# Patient Record
Sex: MALE | Race: WHITE | NOT HISPANIC OR LATINO | Employment: OTHER | URBAN - METROPOLITAN AREA
[De-identification: names, ages, dates, MRNs, and addresses within clinical notes are randomized per-mention and may not be internally consistent; named-entity substitution may affect disease eponyms.]

---

## 2021-12-08 PROBLEM — Z79.621 LONG-TERM CURRENT USE OF TACROLIMUS: Status: ACTIVE | Noted: 2021-12-08

## 2021-12-08 PROBLEM — I25.5 ISCHEMIC CARDIOMYOPATHY: Status: ACTIVE | Noted: 2021-12-08

## 2021-12-08 PROBLEM — I25.10 CORONARY ARTERY DISEASE INVOLVING NATIVE CORONARY ARTERY OF NATIVE HEART WITHOUT ANGINA PECTORIS: Status: ACTIVE | Noted: 2021-12-08

## 2021-12-08 PROBLEM — Z86.39 H/O HYPERGLYCEMIA: Status: ACTIVE | Noted: 2021-12-08

## 2021-12-08 PROBLEM — E78.5 HYPERLIPIDEMIA: Status: ACTIVE | Noted: 2021-12-08

## 2022-03-07 PROBLEM — I50.20 HEART FAILURE WITH REDUCED EJECTION FRACTION, NYHA CLASS II: Status: ACTIVE | Noted: 2022-03-07

## 2022-03-07 PROBLEM — Z86.79 HISTORY OF ATRIAL FIBRILLATION: Status: ACTIVE | Noted: 2022-03-07

## 2023-03-27 PROBLEM — Z79.624 ON MYCOPHENOLATE MOFETIL THERAPY: Status: ACTIVE | Noted: 2023-03-27

## 2023-03-27 PROBLEM — Z79.899 ON MYCOPHENOLATE MOFETIL THERAPY: Status: ACTIVE | Noted: 2023-03-27

## 2023-03-27 PROBLEM — R06.09 DYSPNEA ON EXERTION: Status: ACTIVE | Noted: 2023-03-27

## 2023-03-27 PROBLEM — E66.09 CLASS 2 OBESITY DUE TO EXCESS CALORIES WITHOUT SERIOUS COMORBIDITY WITH BODY MASS INDEX (BMI) OF 37.0 TO 37.9 IN ADULT: Status: ACTIVE | Noted: 2023-03-27

## 2023-03-27 PROBLEM — I20.89 EQUIVALENT ANGINA: Status: ACTIVE | Noted: 2023-03-27

## 2023-03-27 PROBLEM — E66.812 CLASS 2 OBESITY DUE TO EXCESS CALORIES WITHOUT SERIOUS COMORBIDITY WITH BODY MASS INDEX (BMI) OF 37.0 TO 37.9 IN ADULT: Status: ACTIVE | Noted: 2023-03-27

## 2023-03-27 PROBLEM — Z91.89 MULTIPLE RISK FACTORS FOR CORONARY ARTERY DISEASE: Status: ACTIVE | Noted: 2023-03-27

## 2023-05-23 PROBLEM — Z87.891 FORMER SMOKER, STOPPED SMOKING IN DISTANT PAST: Status: ACTIVE | Noted: 2023-05-23

## 2023-08-29 PROBLEM — K81.9 CHOLECYSTITIS: Status: ACTIVE | Noted: 2023-08-29

## 2023-08-29 PROBLEM — R10.9 ABDOMINAL PAIN: Status: ACTIVE | Noted: 2023-08-29

## 2024-05-22 PROBLEM — Z86.39 H/O HYPERGLYCEMIA: Status: RESOLVED | Noted: 2021-12-08 | Resolved: 2024-05-22

## 2024-05-22 PROBLEM — D64.9 ANEMIA: Status: ACTIVE | Noted: 2024-05-22

## 2024-05-22 PROBLEM — Z86.39 HISTORY OF HYPERGLYCEMIA: Status: ACTIVE | Noted: 2024-05-22

## 2024-05-22 PROBLEM — Z90.49 HISTORY OF LAPAROSCOPIC CHOLECYSTECTOMY: Status: ACTIVE | Noted: 2024-05-22

## 2024-05-22 PROBLEM — Z87.891 HISTORY OF TOBACCO ABUSE: Status: ACTIVE | Noted: 2024-05-22

## 2024-05-22 PROBLEM — R06.09 DYSPNEA ON EXERTION: Status: ACTIVE | Noted: 2024-05-22

## 2024-05-22 PROBLEM — R94.31 ABNORMAL EKG: Status: ACTIVE | Noted: 2024-05-22

## 2024-05-22 PROBLEM — Z87.891 FORMER SMOKER, STOPPED SMOKING IN DISTANT PAST: Status: RESOLVED | Noted: 2023-05-23 | Resolved: 2024-05-22

## 2024-05-22 PROBLEM — Z01.818 PRE-OP EVALUATION: Status: ACTIVE | Noted: 2024-05-22

## 2024-05-22 PROBLEM — R06.09 DOE (DYSPNEA ON EXERTION): Status: RESOLVED | Noted: 2023-03-27 | Resolved: 2024-05-22

## 2024-06-20 ENCOUNTER — OFFICE VISIT (OUTPATIENT)
Dept: OPHTHALMOLOGY | Facility: CLINIC | Age: 79
End: 2024-06-20
Payer: MEDICARE

## 2024-06-20 DIAGNOSIS — H25.12 AGE-RELATED NUCLEAR CATARACT OF LEFT EYE: ICD-10-CM

## 2024-06-20 DIAGNOSIS — H40.003 GLAUCOMA SUSPECT OF BOTH EYES: ICD-10-CM

## 2024-06-20 DIAGNOSIS — D09.21 SQUAMOUS CELL CARCINOMA IN SITU (SCCIS) OF RIGHT CONJUNCTIVA: Primary | ICD-10-CM

## 2024-06-20 DIAGNOSIS — H04.129 DRY EYE: ICD-10-CM

## 2024-06-20 DIAGNOSIS — H11.9 CONJUNCTIVAL LESION: ICD-10-CM

## 2024-06-20 PROCEDURE — 99203 OFFICE O/P NEW LOW 30 MIN: CPT | Mod: S$GLB,,, | Performed by: OPHTHALMOLOGY

## 2024-06-20 PROCEDURE — 92285 EXTERNAL OCULAR PHOTOGRAPHY: CPT | Mod: S$GLB,,, | Performed by: OPHTHALMOLOGY

## 2024-06-27 NOTE — PROGRESS NOTES
HPI    Referred by Dr. Rodriguez    Squamous cell carcinoma in-situ, right conjunctiva  Sx: Cataract extraction OD 2019  EXCISION, LESION, CONJUNCTIVA (Right) 04/03/2024    Gtt's: None     Patient is here for SCCIS evaluation.  Pt. States to have completed treatment and is ready to proceed with sx but   at the [facility] the machine broke and was referred to [facility].    Last edited by Carolina Brand MD on 6/28/2024  9:37 AM.            Assessment /Plan     For exam results, see Encounter Report.    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 Plan to do aidan-adjuvant topical therapy with mitomycin C with 0.04% for 2 to 3 cycles. Each cycle of chemotherapy is 1 week on with QID application, 1 week off. We will re-examine the patient every 2 week at the end of each cycle. At the end of the second cycle, we will determine if he needs a 3rd cycle.   - 5/22/24: day after second series of MMC.  Per photos the lesion looks smaller but still at least 1/2 still present. Per Dr. Rodriguez the primary goal of MMC is lowering corneal involvement prior to conj excision      Cycle 1:  1 drop QID OD for 1 week  Break for 1 week -   Cycle 2:  1 drop QID OD for 1 week  Break for 1 week -   Cycle 3 (start 5/29/24):  1 drop QID OD for 1 week  Break for 1 week -         Plan for: conjunctival lesion resection, cryotherapy, alcohol epitheliectomy OD      Needs AmnioGraft 2.5mm x 2.0mm  and 2.0x 1.5mm

## 2024-06-28 ENCOUNTER — TELEPHONE (OUTPATIENT)
Dept: OPHTHALMOLOGY | Facility: CLINIC | Age: 79
End: 2024-06-28
Payer: MEDICARE

## 2024-06-28 NOTE — TELEPHONE ENCOUNTER
----- Message from Tony Norton sent at 6/28/2024 11:14 AM CDT -----  Contact: [phone]  Pt wife is calling to speak with you about her 's sx. Please call back to further assist.

## 2024-07-05 ENCOUNTER — TELEPHONE (OUTPATIENT)
Dept: OPHTHALMOLOGY | Facility: CLINIC | Age: 79
End: 2024-07-05
Payer: MEDICARE

## 2024-07-05 NOTE — TELEPHONE ENCOUNTER
Spoke with pt wife  provided arrival time of 9:00 for sx on 7/12/24 with Dr. Brand @ [facility].

## 2024-07-10 NOTE — PRE-PROCEDURE INSTRUCTIONS
PreOp Instructions given:   - Verbal medication information (what to hold and what to take) -  Pt states he will not be taking any medications the am of Sx  - NPO guidelines 2400  - Arrival place directions given; time to be given the day before procedure by the   Surgeon's Office [address] [facility] [place]  - Bathing with antibacterial soap   - Don't wear any jewelry or bring any valuables AM of surgery   - No makeup or moisturizer to face   - No perfume/cologne, powder, lotions or aftershave   Pt. verbalized understanding.   Pt denies any h/o Anesthesia/Sedation complications or side effects.    Heart transplant - 2/23/2016

## 2024-07-12 ENCOUNTER — HOSPITAL ENCOUNTER (OUTPATIENT)
Facility: HOSPITAL | Age: 79
Discharge: HOME | End: 2024-07-12
Attending: OPHTHALMOLOGY | Admitting: OPHTHALMOLOGY
Payer: MEDICARE

## 2024-07-12 ENCOUNTER — ANESTHESIA (OUTPATIENT)
Dept: SURGERY | Facility: HOSPITAL | Age: 79
End: 2024-07-12
Payer: MEDICARE

## 2024-07-12 ENCOUNTER — ANESTHESIA EVENT (OUTPATIENT)
Dept: SURGERY | Facility: HOSPITAL | Age: 79
End: 2024-07-12
Payer: MEDICARE

## 2024-07-12 VITALS
TEMPERATURE: 98 F | HEIGHT: 71 IN | HEART RATE: 80 BPM | SYSTOLIC BLOOD PRESSURE: 175 MMHG | WEIGHT: 248 LBS | DIASTOLIC BLOOD PRESSURE: 86 MMHG | OXYGEN SATURATION: 79 % | BODY MASS INDEX: 34.72 KG/M2 | RESPIRATION RATE: 18 BRPM

## 2024-07-12 DIAGNOSIS — H25.10 AGE-RELATED NUCLEAR CATARACT: ICD-10-CM

## 2024-07-12 DIAGNOSIS — H11.9 CONJUNCTIVAL LESION: Primary | ICD-10-CM

## 2024-07-12 PROCEDURE — 88342 IMHCHEM/IMCYTCHM 1ST ANTB: CPT | Mod: 26,,, | Performed by: STUDENT IN AN ORGANIZED HEALTH CARE EDUCATION/TRAINING PROGRAM

## 2024-07-12 PROCEDURE — 71000044 HC DOSC ROUTINE RECOVERY FIRST HOUR: Performed by: OPHTHALMOLOGY

## 2024-07-12 PROCEDURE — 68815 PROBE NASOLACRIMAL DUCT: CPT | Mod: RT,,, | Performed by: OPHTHALMOLOGY

## 2024-07-12 PROCEDURE — 63600175 PHARM REV CODE 636 W HCPCS: Performed by: NURSE ANESTHETIST, CERTIFIED REGISTERED

## 2024-07-12 PROCEDURE — 71000015 HC POSTOP RECOV 1ST HR: Performed by: OPHTHALMOLOGY

## 2024-07-12 PROCEDURE — 36000706: Performed by: OPHTHALMOLOGY

## 2024-07-12 PROCEDURE — 27201423 OPTIME MED/SURG SUP & DEVICES STERILE SUPPLY: Performed by: OPHTHALMOLOGY

## 2024-07-12 PROCEDURE — 37000008 HC ANESTHESIA 1ST 15 MINUTES: Performed by: OPHTHALMOLOGY

## 2024-07-12 PROCEDURE — 25000003 PHARM REV CODE 250: Performed by: OPHTHALMOLOGY

## 2024-07-12 PROCEDURE — 88341 IMHCHEM/IMCYTCHM EA ADD ANTB: CPT | Mod: 26,,, | Performed by: STUDENT IN AN ORGANIZED HEALTH CARE EDUCATION/TRAINING PROGRAM

## 2024-07-12 PROCEDURE — 88305 TISSUE EXAM BY PATHOLOGIST: CPT | Mod: 26,,, | Performed by: STUDENT IN AN ORGANIZED HEALTH CARE EDUCATION/TRAINING PROGRAM

## 2024-07-12 PROCEDURE — V2790 AMNIOTIC MEMBRANE: HCPCS | Performed by: OPHTHALMOLOGY

## 2024-07-12 PROCEDURE — 88342 IMHCHEM/IMCYTCHM 1ST ANTB: CPT | Performed by: STUDENT IN AN ORGANIZED HEALTH CARE EDUCATION/TRAINING PROGRAM

## 2024-07-12 PROCEDURE — 25000003 PHARM REV CODE 250: Performed by: NURSE ANESTHETIST, CERTIFIED REGISTERED

## 2024-07-12 PROCEDURE — 88305 TISSUE EXAM BY PATHOLOGIST: CPT | Performed by: STUDENT IN AN ORGANIZED HEALTH CARE EDUCATION/TRAINING PROGRAM

## 2024-07-12 PROCEDURE — 37000009 HC ANESTHESIA EA ADD 15 MINS: Performed by: OPHTHALMOLOGY

## 2024-07-12 PROCEDURE — 88341 IMHCHEM/IMCYTCHM EA ADD ANTB: CPT | Performed by: STUDENT IN AN ORGANIZED HEALTH CARE EDUCATION/TRAINING PROGRAM

## 2024-07-12 PROCEDURE — 36000707: Performed by: OPHTHALMOLOGY

## 2024-07-12 PROCEDURE — 65779 COVER EYE W/MEMBRANE SUTURE: CPT | Mod: 51,RT,, | Performed by: OPHTHALMOLOGY

## 2024-07-12 DEVICE — TISSUE AMNIOGRAFT: Type: IMPLANTABLE DEVICE | Site: EYE | Status: FUNCTIONAL

## 2024-07-12 RX ORDER — DEXAMETHASONE SODIUM PHOSPHATE 4 MG/ML
INJECTION, SOLUTION INTRA-ARTICULAR; INTRALESIONAL; INTRAMUSCULAR; INTRAVENOUS; SOFT TISSUE
Status: DISCONTINUED
Start: 2024-07-12 | End: 2024-07-12 | Stop reason: HOSPADM

## 2024-07-12 RX ORDER — CEFAZOLIN SODIUM 500 MG/2.2ML
INJECTION, POWDER, FOR SOLUTION INTRAMUSCULAR; INTRAVENOUS
Status: DISCONTINUED
Start: 2024-07-12 | End: 2024-07-12 | Stop reason: HOSPADM

## 2024-07-12 RX ORDER — FENTANYL CITRATE 50 UG/ML
INJECTION, SOLUTION INTRAMUSCULAR; INTRAVENOUS
Status: DISCONTINUED | OUTPATIENT
Start: 2024-07-12 | End: 2024-07-12

## 2024-07-12 RX ORDER — LIDOCAINE HYDROCHLORIDE AND EPINEPHRINE 10; 10 MG/ML; UG/ML
INJECTION, SOLUTION INFILTRATION; PERINEURAL
Status: DISCONTINUED
Start: 2024-07-12 | End: 2024-07-12 | Stop reason: HOSPADM

## 2024-07-12 RX ORDER — ONDANSETRON HYDROCHLORIDE 2 MG/ML
4 INJECTION, SOLUTION INTRAVENOUS DAILY PRN
Status: DISCONTINUED | OUTPATIENT
Start: 2024-07-12 | End: 2024-07-12 | Stop reason: HOSPADM

## 2024-07-12 RX ORDER — NEOMYCIN SULFATE, POLYMYXIN B SULFATE, AND DEXAMETHASONE 3.5; 10000; 1 MG/G; [USP'U]/G; MG/G
OINTMENT OPHTHALMIC
Status: DISCONTINUED
Start: 2024-07-12 | End: 2024-07-12 | Stop reason: HOSPADM

## 2024-07-12 RX ORDER — ACETAMINOPHEN 325 MG/1
650 TABLET ORAL EVERY 4 HOURS PRN
Status: DISCONTINUED | OUTPATIENT
Start: 2024-07-12 | End: 2024-07-12 | Stop reason: HOSPADM

## 2024-07-12 RX ORDER — MIDAZOLAM HYDROCHLORIDE 1 MG/ML
INJECTION INTRAMUSCULAR; INTRAVENOUS
Status: DISCONTINUED | OUTPATIENT
Start: 2024-07-12 | End: 2024-07-12

## 2024-07-12 RX ORDER — EPINEPHRINE 1 MG/ML
INJECTION, SOLUTION, CONCENTRATE INTRAVENOUS
Status: DISCONTINUED
Start: 2024-07-12 | End: 2024-07-12 | Stop reason: HOSPADM

## 2024-07-12 RX ORDER — MOXIFLOXACIN 5 MG/ML
1 SOLUTION/ DROPS OPHTHALMIC
Status: COMPLETED | OUTPATIENT
Start: 2024-07-12 | End: 2024-07-12

## 2024-07-12 RX ORDER — GLUCAGON 1 MG
1 KIT INJECTION
Status: DISCONTINUED | OUTPATIENT
Start: 2024-07-12 | End: 2024-07-12 | Stop reason: HOSPADM

## 2024-07-12 RX ORDER — PROPARACAINE HYDROCHLORIDE 5 MG/ML
1 SOLUTION/ DROPS OPHTHALMIC DAILY PRN
Status: DISCONTINUED | OUTPATIENT
Start: 2024-07-12 | End: 2024-07-12 | Stop reason: HOSPADM

## 2024-07-12 RX ADMIN — SODIUM CHLORIDE: 0.9 INJECTION, SOLUTION INTRAVENOUS at 11:07

## 2024-07-12 RX ADMIN — PROPARACAINE HYDROCHLORIDE 1 DROP: 5 SOLUTION/ DROPS OPHTHALMIC at 11:07

## 2024-07-12 RX ADMIN — MOXIFLOXACIN OPHTHALMIC 1 DROP: 5 SOLUTION/ DROPS OPHTHALMIC at 11:07

## 2024-07-12 RX ADMIN — FENTANYL CITRATE 50 MCG: 50 INJECTION, SOLUTION INTRAMUSCULAR; INTRAVENOUS at 12:07

## 2024-07-12 RX ADMIN — MIDAZOLAM HYDROCHLORIDE 1 MG: 2 INJECTION, SOLUTION INTRAMUSCULAR; INTRAVENOUS at 11:07

## 2024-07-12 RX ADMIN — MIDAZOLAM HYDROCHLORIDE 1 MG: 2 INJECTION, SOLUTION INTRAMUSCULAR; INTRAVENOUS at 12:07

## 2024-07-12 NOTE — H&P
History    Chief complaint:  conj lesion    Present Ilness/Diagnosis: conj lesion    Past Medical History:  has a past medical history of Atrial fibrillation, CHF (congestive heart failure), Coronary artery disease, Hypertension, ICD (implantable cardioverter-defibrillator) in place, S/P orthotopic heart transplant, Type 2 diabetes mellitus, and Ventricular tachycardia, nonsustained.    Family History/Social History: refer to chart    Allergies:   Review of patient's allergies indicates:   Allergen Reactions    Entresto [sacubitril-valsartan]     Valsartan        Current Medications: No current facility-administered medications for this encounter.    ASA Score: II     Mallampati Score: II     Plan for Sedation: Moderate Sedation     Patient or Family History of Anesthesia problems : No    Physical Exam    BP: Vital signs stable  General: No apparent distress  HEENT: conj lesion  Lungs: adequate respirations  Heart: + pulses  Abdomen: soft  Rectal/pelvic: deferred    Impression: conj lesion    See previous clinic notes for surgical indications.    Plan: excisional bx with cryo

## 2024-07-12 NOTE — OP NOTE
DATE OF SURGERY: 7/11/24    SURGEON: Carolina Brand MD    PREOPERATIVE DIAGNOSIS:  -   -Squamous cell carcinoma in-situ  of the conjunctiva right eye                                                                                POSTOPERATIVE DIAGNOSIS:  same    PROCEDURE PERFORMED:  Conjunctival lesion excision with cryotherapy and placement of amniotic membrane graft secured with sutures.    ANESTHESIA:  Monitored anesthesia care and topical.    SPECIMEN: Conjuntival lesion, sent to pathology    INDICATIONS FOR PROCEDURE:  The patient presented to clinic with suspicious conjunctival lesion.  Risks, benefits and alternatives were discussed, and the  patient agreed to proceed with lesion excision.     PROCEDURE IN DETAIL:  Patient was met in the preop holding area and the surgical site was marked.  The consent was confirmed to be signed.  The patient was brought into the operating room by the anesthesia  team and placed under monitored anesthesia care.  Then 4% lidocaine drops were placed into the right eye.  The right eye was prepped and draped in a sterile ophthalmic fashion.  A Kalen speculum was placed into the right eye.  The extent of the lesion was measured, and 3mm margins were demarcated with the marking pen.  2% Lidocaine with epi was injected underneath the lesion. Blunt wescotts were used to remove the lesion, taking care to not touch the actual lesion with the instruments.  The limbal aspect was removed with a partial shallow lamellar keratectomy.  The lesion was removed and sent to pathology.  The instruments used were passed off.  Cautery was used to achieve hemostasis.   Cryotherapy was used in a double freeze-thaw manner around the periphery of the site. The bare sclera site was dried and measured.  An amniotic membrane graft graft was secured into position with 8-0 vicryl sutures and evisel glue. Maxitrol ointment was placed into the eye.  The Kalen speculum was removed.  The eye was  washed, dried, patched, and shielded. The patient tolerated the procedure well.  The patient is to follow up next week, sooner if needed.

## 2024-07-12 NOTE — ANESTHESIA POSTPROCEDURE EVALUATION
Anesthesia Post Evaluation    Patient: Ramon Todd    Procedure(s) Performed: Procedure(s) (LRB):  EXCISION, LESION, CONJUNCTIVA (Right)    Final Anesthesia Type: MAC      Patient location during evaluation: PACU  Patient participation: Yes- Able to Participate  Level of consciousness: awake and alert  Post-procedure vital signs: reviewed and stable  Pain management: adequate  Airway patency: patent  NIDIA mitigation strategies: Multimodal analgesia  PONV status at discharge: No PONV  Anesthetic complications: no      Cardiovascular status: stable  Respiratory status: unassisted and spontaneous ventilation  Hydration status: euvolemic  Follow-up not needed.              Vitals Value Taken Time   /86 07/12/24 1317   Temp 36.5 °C (97.7 °F) 07/12/24 1248   Pulse 80 07/12/24 1330   Resp 18 07/12/24 1300   SpO2 79 % 07/12/24 1330         No case tracking events are documented in the log.      Pain/Yu Score: Yu Score: 9 (7/12/2024 12:48 PM)

## 2024-07-12 NOTE — TRANSFER OF CARE
"Anesthesia Transfer of Care Note    Patient: Ramon Todd    Procedure(s) Performed: Procedure(s) (LRB):  EXCISION, LESION, CONJUNCTIVA (Right)    Patient location: PACU    Anesthesia Type: general    Transport from OR: Transported from OR on room air with adequate spontaneous ventilation    Post pain: adequate analgesia    Post assessment: no apparent anesthetic complications and tolerated procedure well    Post vital signs: stable    Level of consciousness: awake    Nausea/Vomiting: no nausea/vomiting    Complications: none    Transfer of care protocol was followed      Last vitals: Visit Vitals  BP (!) 187/97   Pulse 86   Temp 36.5 °C (97.7 °F) (Skin)   Resp 18   Ht 5' 11" (1.803 m)   Wt 112.5 kg (248 lb)   SpO2 95%   BMI 34.59 kg/m²     "

## 2024-07-12 NOTE — ANESTHESIA PREPROCEDURE EVALUATION
07/12/2024  Ramon Todd is a 78 y.o., male.    Past Medical History:   Diagnosis Date    Atrial fibrillation     CHF (congestive heart failure)     Coronary artery disease     Hypertension     ICD (implantable cardioverter-defibrillator) in place     S/P orthotopic heart transplant     Type 2 diabetes mellitus     S/P heart Tx    Ventricular tachycardia, nonsustained        Past Surgical History:   Procedure Laterality Date    CARDIAC CATHETERIZATION Right     CORONARY ARTERY BYPASS GRAFT      2006    EXCISION, LESION, CONJUNCTIVA Right 4/3/2024    Procedure: EXCISION, LESION, CONJUNCTIVA;  Surgeon: Wil Beck MD;  Location: [facility];  Service: Ophthalmology;  Laterality: Right;    HEART TRANSPLANT      2/2016    LAPAROSCOPIC CHOLECYSTECTOMY N/A 08/29/2023    Procedure: CHOLECYSTECTOMY, LAPAROSCOPIC;  Surgeon: Oz Aguero MD;  Location: [facility];  Service: General;  Laterality: N/A;    REMOVAL, ICD      and placement           Pre-op Assessment          Review of Systems  Anesthesia Hx:  No problems with previous Anesthesia   History of prior surgery of interest to airway management or planning:          Denies Family Hx of Anesthesia complications.    Denies Personal Hx of Anesthesia complications.                    Cardiovascular:  Exercise tolerance: good   Hypertension       Denies Angina.       Denies GOLDBERG.   Had heart transplant  Feels well                         Neurological:  Neurology Normal                                          Physical Exam  General: Alert, Oriented and Well nourished    Airway:  Mallampati: II   Mouth Opening: Normal  TM Distance: Normal  Neck ROM: Normal ROM    Dental:  Edentulous    Chest/Lungs:  Normal Respiratory Rate    Heart:  Rate: Normal  Rhythm: Regular Rhythm        Anesthesia Plan  Type of Anesthesia, risks & benefits discussed:    Anesthesia  Type: MAC, Gen Natural Airway  Intra-op Monitoring Plan: Standard ASA Monitors  Post Op Pain Control Plan: multimodal analgesia and IV/PO Opioids PRN  Informed Consent: Informed consent signed with the Patient and all parties understand the risks and agree with anesthesia plan.  All questions answered.   ASA Score: 3  Day of Surgery Review of History & Physical: H&P Update referred to the surgeon/provider.    Ready For Surgery From Anesthesia Perspective.     .

## 2024-07-12 NOTE — BRIEF OP NOTE
BRIEF DISCHARGE NOTE:    Date of discharge: 07/12/2024    Reason for hospitalization -  conj surgery     Final Diagnosis - conj lesion    Procedures and treatment provided - Status post conj lesion excision    Diet - Advance to regular as tolerated    Activity - as tolerated    Disposition at the end of the case - Good.    Discharge: to home    The patient tolerated the procedure well and knows to follow up with next week, sooner if needed.    Patient and family instructions (as appropriate) - Given to patient on discharge    Carolina Brand MD

## 2024-07-19 LAB
COMMENT: NORMAL
FINAL PATHOLOGIC DIAGNOSIS: NORMAL
GROSS: NORMAL
Lab: NORMAL
MICROSCOPIC EXAM: NORMAL

## 2025-05-13 ENCOUNTER — OFFICE VISIT (OUTPATIENT)
Dept: CARDIOLOGY | Facility: CLINIC | Age: 80
End: 2025-05-13
Attending: INTERNAL MEDICINE
Payer: MEDICARE

## 2025-05-13 VITALS
WEIGHT: 259.94 LBS | HEART RATE: 85 BPM | OXYGEN SATURATION: 98 % | DIASTOLIC BLOOD PRESSURE: 92 MMHG | HEIGHT: 71 IN | RESPIRATION RATE: 18 BRPM | SYSTOLIC BLOOD PRESSURE: 170 MMHG | BODY MASS INDEX: 36.39 KG/M2

## 2025-05-13 DIAGNOSIS — R73.01 IFG (IMPAIRED FASTING GLUCOSE): ICD-10-CM

## 2025-05-13 DIAGNOSIS — I10 ESSENTIAL HYPERTENSION: ICD-10-CM

## 2025-05-13 DIAGNOSIS — E78.5 HYPERLIPIDEMIA, UNSPECIFIED HYPERLIPIDEMIA TYPE: ICD-10-CM

## 2025-05-13 DIAGNOSIS — Z94.1 S/P ORTHOTOPIC HEART TRANSPLANT: Primary | ICD-10-CM

## 2025-05-13 DIAGNOSIS — E66.09 CLASS 2 OBESITY DUE TO EXCESS CALORIES WITHOUT SERIOUS COMORBIDITY WITH BODY MASS INDEX (BMI) OF 37.0 TO 37.9 IN ADULT: ICD-10-CM

## 2025-05-13 DIAGNOSIS — Z76.89 ENCOUNTER TO ESTABLISH CARE: ICD-10-CM

## 2025-05-13 DIAGNOSIS — R94.31 ABNORMAL EKG: ICD-10-CM

## 2025-05-13 DIAGNOSIS — E66.812 CLASS 2 OBESITY DUE TO EXCESS CALORIES WITHOUT SERIOUS COMORBIDITY WITH BODY MASS INDEX (BMI) OF 37.0 TO 37.9 IN ADULT: ICD-10-CM

## 2025-05-13 DIAGNOSIS — Z01.818 PREOPERATIVE EXAMINATION: ICD-10-CM

## 2025-05-13 PROCEDURE — 99999 PR PBB SHADOW E&M-EST. PATIENT-LVL III: CPT | Mod: PBBFAC,,, | Performed by: INTERNAL MEDICINE

## 2025-05-13 RX ORDER — MELOXICAM 15 MG/1
15 TABLET ORAL
COMMUNITY
Start: 2024-12-10

## 2025-05-13 NOTE — ASSESSMENT & PLAN NOTE
His current Electrocardiogram shows nonspecific ST changes only.  Compared to previous tracings the ST segments are somewhat more pronounced.

## 2025-05-13 NOTE — ASSESSMENT & PLAN NOTE
[facility], [place].  They advised him to follow-up locally for blood work echoes and Electrocardiogram.    Exam is due in [place] by July 1, 2025.    He is doing well current therapy to continue.

## 2025-05-13 NOTE — ASSESSMENT & PLAN NOTE
Carvedilol and amlodipine to continue.  Condition controlled.  Blood pressure today in my office elevated.

## 2025-05-13 NOTE — PROGRESS NOTES
[facility] Cardiology     Subjective:    Patient ID:  Ramon Todd is a 79 y.o. male who presents for evaluation of Heart transplant 2016 [place], Hyperlipidemia, and Hypertension    Review of patient's allergies indicates:   Allergen Reactions    Entresto [sacubitril-valsartan]     Valsartan      The patient underwent cardiac transplantation 2016 February in [facility].  He has done well since then.  He had had previous bypass with end-stage left ventricular dysfunction.  He had been evaluated at this facility but he was told he was too old and they could not offer him transplant.    His history included previous bypass surgery.  His initial bypass surgery was 2006.  He had a defibrillator prior to his transplant.    He has had negative stress tests.  He is on therapy for hypertension and hyperlipidemia.  He was being followed at [facility] but he is had trouble getting reports to his team in [place] prompting the decision to establish at this facility.    His cardiologist in [place] is Lobito Wahl [phone].    His coordinator is Gely [phone].  Fax [phone].    He states he needs yearly echoes and Electrocardiogram is as well as blood work.  His last video visit with the team at [facility] was December 2024.    He states he only needs rejection drug blood levels once a year.  He is going to supply me a request for what blood work is needed.    Today's electrocardiogram reviewed and independently interpreted by myself confirms heart rate 86 sinus rhythm nonspecific ST changes.  No infarct pattern.    Last stress echo 2024 EF 55% without significant valvular disease or evidence of pulmonary hypertension.         Review of Systems   Constitutional: Negative for chills, decreased appetite, diaphoresis, fever, malaise/fatigue, night sweats, weight gain and weight loss.   HENT:  Negative for  congestion, ear discharge, ear pain, hearing loss, hoarse voice, nosebleeds, odynophagia, sore throat, stridor and tinnitus.    Eyes:  Negative for blurred vision, discharge, double vision, pain, photophobia, redness, vision loss in left eye, vision loss in right eye, visual disturbance and visual halos.   Cardiovascular:  Negative for chest pain, claudication, cyanosis, dyspnea on exertion, irregular heartbeat, leg swelling, near-syncope, orthopnea, palpitations, paroxysmal nocturnal dyspnea and syncope.   Respiratory:  Negative for cough, hemoptysis, shortness of breath, sleep disturbances due to breathing, snoring, sputum production and wheezing.    Endocrine: Negative for cold intolerance, heat intolerance, polydipsia, polyphagia and polyuria.   Hematologic/Lymphatic: Negative for adenopathy and bleeding problem. Does not bruise/bleed easily.   Skin:  Negative for color change, dry skin, flushing, itching, nail changes, poor wound healing, rash, skin cancer, suspicious lesions and unusual hair distribution.   Musculoskeletal:  Negative for arthritis, back pain, falls, gout, joint pain, joint swelling, muscle cramps, muscle weakness, myalgias, neck pain and stiffness.   Gastrointestinal:  Negative for bloating, abdominal pain, anorexia, change in bowel habit, bowel incontinence, constipation, diarrhea, dysphagia, excessive appetite, flatus, heartburn, hematemesis, hematochezia, hemorrhoids, jaundice, melena, nausea and vomiting.   Genitourinary:  Negative for bladder incontinence, decreased libido, dysuria, flank pain, frequency, genital sores, hematuria, hesitancy, incomplete emptying, nocturia and urgency.   Neurological:  Negative for aphonia, brief paralysis, difficulty with concentration, disturbances in coordination, excessive daytime sleepiness, dizziness, focal weakness, headaches, light-headedness, loss of balance, numbness, paresthesias, seizures, sensory change, tremors, vertigo and weakness.  "  Psychiatric/Behavioral:  Negative for altered mental status, depression, hallucinations, memory loss, substance abuse, suicidal ideas and thoughts of violence. The patient does not have insomnia and is not nervous/anxious.    Allergic/Immunologic: Negative for hives and persistent infections.        Objective:       Vitals:    05/13/25 0944   BP: (!) 170/92   Patient Position: Sitting   Pulse: 85   Resp: 18   SpO2: 98%   Weight: 117.9 kg (259 lb 14.8 oz)   Height: 5' 11" (1.803 m)    Physical Exam  Constitutional:       General: He is not in acute distress.     Appearance: He is well-developed. He is not diaphoretic.   HENT:      Head: Normocephalic and atraumatic.      Nose: Nose normal.   Eyes:      General: No scleral icterus.        Right eye: No discharge.      Conjunctiva/sclera: Conjunctivae normal.      Pupils: Pupils are equal, round, and reactive to light.   Neck:      Thyroid: No thyromegaly.      Vascular: No JVD.      Trachea: No tracheal deviation.   Cardiovascular:      Rate and Rhythm: Normal rate and regular rhythm.      Pulses:           Carotid pulses are 2+ on the right side and 2+ on the left side.       Radial pulses are 2+ on the right side and 2+ on the left side.        Dorsalis pedis pulses are 2+ on the right side and 2+ on the left side.        Posterior tibial pulses are 2+ on the right side and 2+ on the left side.      Heart sounds: Normal heart sounds. No murmur heard.     No friction rub. No gallop.   Pulmonary:      Effort: Pulmonary effort is normal. No respiratory distress.      Breath sounds: Normal breath sounds. No stridor. No wheezing or rales.   Chest:      Chest wall: No tenderness.   Abdominal:      General: Bowel sounds are normal. There is no distension.      Palpations: Abdomen is soft. There is no mass.      Tenderness: There is no abdominal tenderness. There is no guarding or rebound.   Musculoskeletal:         General: No tenderness. Normal range of motion.      " Cervical back: Normal range of motion and neck supple.   Lymphadenopathy:      Cervical: No cervical adenopathy.   Skin:     General: Skin is warm and dry.      Coloration: Skin is not pale.      Findings: No erythema or rash.   Neurological:      Mental Status: He is alert and oriented to person, place, and time.      Cranial Nerves: No cranial nerve deficit.      Coordination: Coordination normal.   Psychiatric:         Behavior: Behavior normal.         Thought Content: Thought content normal.         Judgment: Judgment normal.           Assessment:       1. S/P orthotopic heart transplant    2. Encounter to establish care    3. Preoperative examination    4. Hyperlipidemia, unspecified hyperlipidemia type    5. Essential hypertension    6. Abnormal EKG    7. IFG (impaired fasting glucose)    8. 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  Clinical correlation is recommended.      Microscopic Exam       Microscopic examination showed dysplastic conjunctival epithelium with  overlying hyperkeratosis, acanthosis, loss of goblet cells and marked atypia.  No keratin whorls were identified.  The specimen was received unoriented but the high-grade dysplastic   epithelium did not extend to the presumed excision margins.  No invasive malignant process was identified.    Immunostains for p16, p53 and Ki67 proliferation index were performed: p16 showed block-immunopositive staining in the dysplastic epithelium; p53 was diffusely immunopositive in the dysplastic epithelium; Ki67 proliferation index was up to 80%.  Positive   controls and internal negative controls for immunostains were examined and were appropriate.      Gross       Patient ID/MRN:  [id number]   Pathology label MRN:  [id number]    The specimen is received in formalin labeled with the patient's name, medical record number and designated as &quot;1) squamous cell carcinoma (Rt eye)&quot;. The specimen consists of a 0.7 x 0.4 x 0.2 cm tan-white to red-black, glistening, smooth   fragment of soft tissue. The specimen is remarkable for a 0.2 x 0.4 cm red-black, soft nodule measuring <0.1 cm from the nearest resection margin. The specimen is inked blue at the presumed resection margin, bisected and submitted entirely in cassette   [id number].       Trisha Mac  Grossing Technologist      Disclaimer       Unless the case is a 'gross only' or additional testing only, the final diagnosis for each specimen is based on a microscopic examination of appropriate tissue sections.       Current Medications[1]     Lab Results   Component Value Date    WBC 8.72 05/22/2024    RBC 5.12 05/22/2024    HGB 15.2 05/22/2024    HCT 47.0 05/22/2024    MCV 92 05/22/2024    MCH 29.7 05/22/2024    MCHC 32.3 05/22/2024    RDW 14.2 05/22/2024     05/22/2024    MPV 10.4 05/22/2024    GRAN 5.4 05/22/2024    GRAN 61.7 05/22/2024    LYMPH 2.2 05/22/2024    LYMPH 25.0 05/22/2024    MONO 0.9 05/22/2024    MONO 10.0 05/22/2024    EOS 0.2 05/22/2024     "BASO 0.05 05/22/2024    EOSINOPHIL 2.1 05/22/2024    BASOPHIL 0.6 05/22/2024    MG 1.6 05/22/2024        CMP  Lab Results   Component Value Date     09/25/2024    K 4.2 09/25/2024     05/22/2024    CO2 28 09/25/2024    GLU 97 05/22/2024    BUN 16.9 09/25/2024    CREATININE 1.27 09/25/2024    CALCIUM 9.0 09/25/2024    PROT 8.0 05/22/2024    ALBUMIN 4.0 05/22/2024    BILITOT 0.5 05/22/2024    ALKPHOS 70 05/22/2024    AST 25 05/22/2024    ALT 13 05/22/2024    ANIONGAP 7 (L) 09/25/2024    ESTGFRAFRICA >60.0 12/15/2021    EGFRNONAA >60.0 12/15/2021        No results found for: "LABBLOO", "LABURIN", "RESPIRATORYC", "GSRESP"         Results for orders placed or performed in visit on 05/22/24   EKG 12-lead    Collection Time: 05/22/24  2:25 PM   Result Value Ref Range    QRS Duration 98 ms    [facility] QTC Calculation 466 ms    Narrative    Test Reason : Z94.1,    Vent. Rate : 077 BPM     Atrial Rate : 077 BPM     P-R Int : 176 ms          QRS Dur : 098 ms      QT Int : 412 ms       P-R-T Axes : 048 083 036 degrees     QTc Int : 466 ms    Normal sinus rhythm  Nonspecific T wave abnormality  Prolonged QT  When compared with ECG of 04-MAR-2024 12:23,  No significant change was found  Confirmed by Spencer Garay MD ([id number]) on 5/23/2024 8:06:03 AM    Referred By:             Confirmed By:Spencer Garay MD        Slit Lamp Photography - OU - Both Eyes 06/28/2024  Final   Stress Echo Which stress agent will be used? Pharmacological 05/31/2024 [id number] Final   X-Ray Chest PA And Lateral 03/04/2024 [id number] Final             Plan:       Problem List Items Addressed This Visit          Cardiac/Vascular    Essential hypertension    Carvedilol and amlodipine to continue.  Condition controlled.  Blood pressure today in my office elevated.         S/P orthotopic heart transplant - Primary    [facility], [place].  They advised him to follow-up locally for blood work echoes and Electrocardiogram.    Exam is due in " [place] by July 1, 2025.    He is doing well current therapy to continue.         Relevant Orders    Echo    Hyperlipidemia    Atorvastatin will be continued.  He is on 10 mg.  Last LDL 57 mg% by labs 2024 May.         Abnormal EKG    His current Electrocardiogram shows nonspecific ST changes only.  Compared to previous tracings the ST segments are somewhat more pronounced.            Endocrine    Class 2 obesity due to excess calories without serious comorbidity with body mass index (BMI) of 37.0 to 37.9 in adult    Weight loss encouraged.         IFG (impaired fasting glucose)    Noted on labs developing over the past couple of years.          Other Visit Diagnoses         Encounter to establish care          Preoperative examination                   Echocardiogram ordered.  Results to be sent to [place] once available.  They need it by July he states.  The blood work will be sent separately.    The patient knows his routine and protocol very well.    I made a 1 year follow-up but could see the patient anytime.  I encouraged him to consider establishing at least 1 visit with the transplant team at [facility].             Reinier Lo MD  05/13/2025   10:13 AM               [1]   Current Outpatient Medications:     amLODIPine (NORVASC) 5 MG tablet, Take 1 tablet (5 mg total) by mouth once daily., Disp: 90 tablet, Rfl: 3    atorvastatin (LIPITOR) 10 MG tablet, Take 1 tablet (10 mg total) by mouth once daily., Disp: 90 tablet, Rfl: 3    carvediloL (COREG) 25 MG tablet, Take 25 mg by mouth 2 (two) times daily with meals., Disp: , Rfl:     furosemide (LASIX) 20 MG tablet, Take 1 tablet (20 mg total) by mouth 2 (two) times daily as needed (swelling, fluid)., Disp: 180 tablet, Rfl: 5    meloxicam (MOBIC) 15 MG tablet, Take 15 mg by mouth., Disp: , Rfl:     mycophenolate (CELLCEPT) 250 mg Cap, Take 250 mg by mouth every morning., Disp: , Rfl:     tacrolimus (PROGRAF) 1 MG Cap, Take 1 mg by mouth 2 (two) times a day.,  Disp: , Rfl:     tamsulosin (FLOMAX) 0.4 mg Cap, Take 0.4 mg by mouth 2 (two) times a day., Disp: , Rfl:   No current facility-administered medications for this visit.

## 2025-05-14 LAB
OHS QRS DURATION: 94 MS
OHS QTC CALCULATION: 449 MS

## 2025-06-04 ENCOUNTER — TELEPHONE (OUTPATIENT)
Dept: CARDIOLOGY | Facility: CLINIC | Age: 80
End: 2025-06-04
Payer: MEDICARE

## 2025-06-04 DIAGNOSIS — Z94.1 S/P ORTHOTOPIC HEART TRANSPLANT: Primary | ICD-10-CM

## 2025-06-12 ENCOUNTER — HOSPITAL ENCOUNTER (OUTPATIENT)
Dept: RADIOLOGY | Facility: HOSPITAL | Age: 80
Discharge: HOME | End: 2025-06-12
Attending: INTERNAL MEDICINE
Payer: MEDICARE

## 2025-06-12 ENCOUNTER — PATIENT MESSAGE (OUTPATIENT)
Dept: CARDIOLOGY | Facility: CLINIC | Age: 80
End: 2025-06-12
Payer: MEDICARE

## 2025-06-12 ENCOUNTER — HOSPITAL ENCOUNTER (OUTPATIENT)
Dept: PULMONOLOGY | Facility: HOSPITAL | Age: 80
Discharge: HOME | End: 2025-06-12
Attending: INTERNAL MEDICINE
Payer: MEDICARE

## 2025-06-12 DIAGNOSIS — Z94.1 S/P ORTHOTOPIC HEART TRANSPLANT: Primary | ICD-10-CM

## 2025-06-12 DIAGNOSIS — Z94.1 S/P ORTHOTOPIC HEART TRANSPLANT: ICD-10-CM

## 2025-06-12 DIAGNOSIS — E11.9 DIABETES MELLITUS WITHOUT COMPLICATION: ICD-10-CM

## 2025-06-12 DIAGNOSIS — E07.9 THYROID DISEASE: ICD-10-CM

## 2025-06-12 DIAGNOSIS — E78.5 HYPERLIPIDEMIA, UNSPECIFIED HYPERLIPIDEMIA TYPE: ICD-10-CM

## 2025-06-12 PROCEDURE — 71046 X-RAY EXAM CHEST 2 VIEWS: CPT | Mod: 26,,, | Performed by: RADIOLOGY

## 2025-06-12 PROCEDURE — 71046 X-RAY EXAM CHEST 2 VIEWS: CPT | Mod: TC

## 2025-06-12 PROCEDURE — 99900035 HC TECH TIME PER 15 MIN (STAT)

## 2025-06-12 PROCEDURE — 93306 TTE W/DOPPLER COMPLETE: CPT | Mod: 26,,, | Performed by: INTERNAL MEDICINE

## 2025-06-12 PROCEDURE — 93005 ELECTROCARDIOGRAM TRACING: CPT

## 2025-06-12 PROCEDURE — 93306 TTE W/DOPPLER COMPLETE: CPT

## 2025-06-12 PROCEDURE — 93010 ELECTROCARDIOGRAM REPORT: CPT | Mod: ,,, | Performed by: INTERNAL MEDICINE

## 2025-06-13 LAB
ASCENDING AORTA: 2.9 CM
AV INDEX (PROSTH): 1.34
AV MEAN GRADIENT: 2 MMHG
AV PEAK GRADIENT: 3 MMHG
AV VALVE AREA BY VELOCITY RATIO: 2.2 CM²
AV VALVE AREA: 3.4 CM²
AV VELOCITY RATIO: 0.88
CV ECHO LV RWT: 0.47 CM
DOP CALC AO PEAK VEL: 0.8 M/S
DOP CALC AO VTI: 13.3 CM
DOP CALC LVOT AREA: 2.5 CM2
DOP CALC LVOT DIAMETER: 1.8 CM
DOP CALC LVOT PEAK VEL: 0.7 M/S
DOP CALC LVOT STROKE VOLUME: 45.3 CM3
DOP CALC RVOT PEAK VEL: 0.69 M/S
DOP CALC RVOT VTI: 14.5 CM
DOP CALCLVOT PEAK VEL VTI: 17.8 CM
E WAVE DECELERATION TIME: 180 MSEC
E/A RATIO: 1.83
E/E' RATIO: 6 M/S
ECHO LV POSTERIOR WALL: 1.1 CM (ref 0.6–1.1)
FRACTIONAL SHORTENING: 19.1 % (ref 28–44)
INTERVENTRICULAR SEPTUM: 1.1 CM (ref 0.6–1.1)
IVC DIAMETER: 2.03 CM
IVRT: 137 MSEC
LA MAJOR: 6.2 CM
LA MINOR: 6.2 CM
LA WIDTH: 3.2 CM
LEFT ATRIUM AREA SYSTOLIC (APICAL 2 CHAMBER): 23.5 CM2
LEFT ATRIUM AREA SYSTOLIC (APICAL 4 CHAMBER): 23.81 CM2
LEFT ATRIUM SIZE: 5.4 CM
LEFT ATRIUM VOLUME MOD: 68 ML
LEFT ATRIUM VOLUME: 91 CM3
LEFT INTERNAL DIMENSION IN SYSTOLE: 3.8 CM (ref 2.1–4)
LEFT VENTRICLE DIASTOLIC VOLUME: 101 ML
LEFT VENTRICLE END SYSTOLIC VOLUME APICAL 2 CHAMBER: 64.54 ML
LEFT VENTRICLE END SYSTOLIC VOLUME APICAL 4 CHAMBER: 69.05 ML
LEFT VENTRICLE SYSTOLIC VOLUME: 60 ML
LEFT VENTRICULAR INTERNAL DIMENSION IN DIASTOLE: 4.7 CM (ref 3.5–6)
LEFT VENTRICULAR MASS: 187.5 G
LV LATERAL E/E' RATIO: 6 M/S
LV SEPTAL E/E' RATIO: 6 M/S
LVED V (TEICH): 101.4 ML
LVES V (TEICH): 60.45 ML
LVOT MG: 1.51 MMHG
LVOT MV: 0.59 CM/S
MV PEAK A VEL: 0.36 M/S
MV PEAK E VEL: 0.66 M/S
MV STENOSIS PRESSURE HALF TIME: 52.11 MS
MV VALVE AREA P 1/2 METHOD: 4.22 CM2
OHS CV RV/LV RATIO: 0.7 CM
OHS QRS DURATION: 94 MS
OHS QTC CALCULATION: 411 MS
PV MEAN GRADIENT: 1 MMHG
PV MV: 0.53 M/S
PV PEAK GRADIENT: 2 MMHG
PV PEAK VELOCITY: 0.67 M/S
RA MAJOR: 4.54 CM
RA PRESSURE ESTIMATED: 3 MMHG
RIGHT VENTRICLE DIASTOLIC BASEL DIMENSION: 3.3 CM
RIGHT VENTRICULAR END-DIASTOLIC DIMENSION: 3.3 CM
SINUS: 2.77 CM
STJ: 2.9 CM
TDI LATERAL: 0.11 M/S
TDI SEPTAL: 0.11 M/S
TDI: 0.11 M/S

## 2025-06-23 ENCOUNTER — TELEPHONE (OUTPATIENT)
Dept: CARDIOLOGY | Facility: CLINIC | Age: 80
End: 2025-06-23
Payer: MEDICARE

## 2025-06-23 NOTE — TELEPHONE ENCOUNTER
----- Message from Eleanor sent at 2025  2:33 PM CDT -----  Contact: Gely/Dr. [facility]  Ramon Todd  MRN: [id number]  : 1945  PCP: Reinier Jason  Home Phone      [phone]  Work Phone      Not on file.  Mobile          [phone]      MESSAGE:  Gely with Dr. Wahl's  Cardiology in [place] is requesting a copy of the following test result's of pt.   Echo, Stress test, EKG,chest xray, & bone density. They said they had faxed a request already and have not received back anything as of today.      Phone [phone]  Fax [phone]

## 2025-06-23 NOTE — TELEPHONE ENCOUNTER
----- Message from Eleanor sent at 2025  1:56 PM CDT -----  Contact: Kristin / wife  Ramon Todd  MRN: [id number]  : 1945  PCP: Reinier Jason  Home Phone      [phone]  Work Phone      Not on file.  Mobile          [phone]      MESSAGE: Ms Foster called again to leave another message in regards to pt. If they do not hear from yall by 4pm this afternoon they will go to [facility] to get his blood work done...  She left a previous message on .

## 2025-06-30 ENCOUNTER — TELEPHONE (OUTPATIENT)
Dept: CARDIOLOGY | Facility: CLINIC | Age: 80
End: 2025-06-30
Payer: MEDICARE

## 2025-06-30 NOTE — TELEPHONE ENCOUNTER
----- Message from Rebecca Morris NP sent at 2025  4:59 PM CDT -----  Contact: Kristin/wife  Please inform patient echo is normal.     CMZ  ----- Message -----  From: Nichole Torres MA  Sent: 2025   8:42 AM CDT  To: Rebecca Morris NP    Can you please review?  ----- Message -----  From: Eleanor Pendleton  Sent: 2025   8:41 AM CDT  To: [facility] Staff    Ramon Todd  MRN: [id number]  : 1945  PCP: Reinier Jason  Home Phone      [phone]  Work Phone      Not on file.  Mobile          [phone]      MESSAGE: Ms. Foster states pt had some test done  and they were waiting to see how the test came out to determine if he would be needing labs done.  She states it is very important they hear something back soon due to his  heart transplant rejection medication               [phone]   Resulted

## (undated) DEVICE — Device

## (undated) DEVICE — CONTAINER SPECIMEN OR STER 4OZ

## (undated) DEVICE — GOWN SURGICAL X-LARGE

## (undated) DEVICE — SOL WATER STRL IRR 1000ML

## (undated) DEVICE — SUT 8-0 8 COATED VICRYL VI

## (undated) DEVICE — GLOVE BIOGEL ECLIPSE SZ 8

## (undated) DEVICE — SYR SLIP TIP 1CC

## (undated) DEVICE — SOL BETADINE 5%

## (undated) DEVICE — PENCIL BIPOLAR 18G STR 2 PIN

## (undated) DEVICE — BLADE SCALP OPHTL BEVEL STR

## (undated) DEVICE — FORCEP CURVED DISP

## (undated) DEVICE — DRAPE THREE-QTR REINF 53X77IN

## (undated) DEVICE — BURR 1.4MM CUT DIAMOND FLEX

## (undated) DEVICE — DRESSING TRANSPARENT 4X4.75

## (undated) DEVICE — CORD BIPOLAR 12 FOOT

## (undated) DEVICE — SPONGE WEC CEL SPEARS

## (undated) DEVICE — DRAPE OPHTHALMIC 48X62 FEN

## (undated) DEVICE — DRESSING EYE OVAL LF

## (undated) DEVICE — SPONGE COTTON TRAY 4X4IN

## (undated) DEVICE — DRESSING TRANS 4X4 TEGADERM